# Patient Record
Sex: MALE
[De-identification: names, ages, dates, MRNs, and addresses within clinical notes are randomized per-mention and may not be internally consistent; named-entity substitution may affect disease eponyms.]

---

## 2024-07-25 PROBLEM — Z00.00 ENCOUNTER FOR PREVENTIVE HEALTH EXAMINATION: Status: ACTIVE | Noted: 2024-07-25

## 2024-07-31 ENCOUNTER — NON-APPOINTMENT (OUTPATIENT)
Age: 29
End: 2024-07-31

## 2024-08-01 ENCOUNTER — APPOINTMENT (OUTPATIENT)
Dept: PULMONOLOGY | Facility: CLINIC | Age: 29
End: 2024-08-01
Payer: MEDICAID

## 2024-08-01 VITALS
DIASTOLIC BLOOD PRESSURE: 71 MMHG | OXYGEN SATURATION: 97 % | HEIGHT: 68 IN | HEART RATE: 68 BPM | BODY MASS INDEX: 22.73 KG/M2 | RESPIRATION RATE: 12 BRPM | TEMPERATURE: 97.9 F | SYSTOLIC BLOOD PRESSURE: 118 MMHG | WEIGHT: 150 LBS

## 2024-08-01 DIAGNOSIS — R06.02 SHORTNESS OF BREATH: ICD-10-CM

## 2024-08-01 DIAGNOSIS — Z78.9 OTHER SPECIFIED HEALTH STATUS: ICD-10-CM

## 2024-08-01 PROCEDURE — 99203 OFFICE O/P NEW LOW 30 MIN: CPT

## 2024-08-01 NOTE — HISTORY OF PRESENT ILLNESS
[Never] : never [TextBox_4] : 28-year-old never smoker presenting for evaluation with complaint of shortness of breath. States that the feeling comes and goes and that he did notice it when he was walking up a hill. He is being worked up for celiac disease and during this time has stopped gluten which has had improvement in his breathing. He denies hx of lung disease that he knows of as well as family hx of. Does have family hx of heart disease, however. Denies wheezing, cough or chest pain. Does state that his main complaint is that he feels he does not get in enough air at times. [ESS] : 3

## 2024-08-01 NOTE — ASSESSMENT
[FreeTextEntry1] : 28 year old never smoker presenting with complaint of shortness of breath   Data reviewed:   shortness of breath   Based off patient symptoms it is unclear as to why he is having this sensation. He has no symptoms that point to asthma, no workplace exposure, and no other signs of a lung disease. So will plan to begin with pulmonary function testing. Based off findings will determine need for further work up. He does have what sounds like bloating with gluten and has improved since he stopped gluten. So, favor if he is having bloating that would cause a lung restriction that could cause his symptoms.  - Shortness of breath of undetermined cause will start with pulmonary function testing - Recommend he follows with GI for the bloating.

## 2024-08-06 ENCOUNTER — APPOINTMENT (OUTPATIENT)
Dept: PULMONOLOGY | Facility: CLINIC | Age: 29
End: 2024-08-06

## 2024-08-06 PROCEDURE — 94060 EVALUATION OF WHEEZING: CPT

## 2024-08-06 PROCEDURE — 94729 DIFFUSING CAPACITY: CPT

## 2024-08-06 PROCEDURE — 94727 GAS DIL/WSHOT DETER LNG VOL: CPT

## 2024-08-08 ENCOUNTER — NON-APPOINTMENT (OUTPATIENT)
Age: 29
End: 2024-08-08